# Patient Record
Sex: FEMALE | Race: WHITE | NOT HISPANIC OR LATINO | URBAN - METROPOLITAN AREA
[De-identification: names, ages, dates, MRNs, and addresses within clinical notes are randomized per-mention and may not be internally consistent; named-entity substitution may affect disease eponyms.]

---

## 2017-08-17 ENCOUNTER — OUTPATIENT (OUTPATIENT)
Dept: OUTPATIENT SERVICES | Facility: HOSPITAL | Age: 72
LOS: 1 days | End: 2017-08-17
Payer: MEDICARE

## 2017-08-17 ENCOUNTER — APPOINTMENT (OUTPATIENT)
Dept: ORTHOPEDIC SURGERY | Facility: CLINIC | Age: 72
End: 2017-08-17
Payer: MEDICARE

## 2017-08-17 ENCOUNTER — APPOINTMENT (OUTPATIENT)
Dept: CT IMAGING | Facility: CLINIC | Age: 72
End: 2017-08-17

## 2017-08-17 VITALS — HEIGHT: 63.5 IN | WEIGHT: 83 LBS | BODY MASS INDEX: 14.52 KG/M2

## 2017-08-17 DIAGNOSIS — S72.002A FRACTURE OF UNSPECIFIED PART OF NECK OF LEFT FEMUR, INITIAL ENCOUNTER FOR CLOSED FRACTURE: ICD-10-CM
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
## 2017-08-17 DIAGNOSIS — S72.012D UNSPECIFIED INTRACAPSULAR FRACTURE OF LEFT FEMUR, SUBSEQUENT ENCOUNTER FOR CLOSED FRACTURE WITH ROUTINE HEALING: ICD-10-CM

## 2017-08-17 DIAGNOSIS — Z80.9 FAMILY HISTORY OF MALIGNANT NEOPLASM, UNSPECIFIED: ICD-10-CM

## 2017-08-17 DIAGNOSIS — Z87.891 PERSONAL HISTORY OF NICOTINE DEPENDENCE: ICD-10-CM

## 2017-08-17 DIAGNOSIS — Z78.9 OTHER SPECIFIED HEALTH STATUS: ICD-10-CM

## 2017-08-17 DIAGNOSIS — Z00.8 ENCOUNTER FOR OTHER GENERAL EXAMINATION: ICD-10-CM

## 2017-08-17 PROCEDURE — 99205 OFFICE O/P NEW HI 60 MIN: CPT

## 2017-08-17 PROCEDURE — 73700 CT LOWER EXTREMITY W/O DYE: CPT | Mod: 26,LT

## 2017-08-17 PROCEDURE — 73552 X-RAY EXAM OF FEMUR 2/>: CPT | Mod: LT

## 2017-08-17 PROCEDURE — 76376 3D RENDER W/INTRP POSTPROCES: CPT

## 2017-08-17 PROCEDURE — 73700 CT LOWER EXTREMITY W/O DYE: CPT

## 2017-08-17 PROCEDURE — 73502 X-RAY EXAM HIP UNI 2-3 VIEWS: CPT | Mod: LT

## 2017-08-20 PROBLEM — Z87.891 FORMER SMOKER: Status: ACTIVE | Noted: 2017-08-17

## 2017-08-20 PROBLEM — Z80.9 FAMILY HISTORY OF MALIGNANT NEOPLASM: Status: ACTIVE | Noted: 2017-08-17

## 2017-08-20 PROBLEM — Z78.9 EXERCISES RARELY: Status: ACTIVE | Noted: 2017-08-17

## 2017-08-20 PROBLEM — S72.002A: Status: ACTIVE | Noted: 2017-08-17

## 2018-07-17 ENCOUNTER — OTHER (OUTPATIENT)
Age: 73
End: 2018-07-17

## 2018-07-24 PROBLEM — Z78.9 ALCOHOL USE: Status: INACTIVE | Noted: 2017-08-17

## 2023-01-18 ENCOUNTER — APPOINTMENT (OUTPATIENT)
Dept: RADIOLOGY | Facility: HOSPITAL | Age: 78
End: 2023-01-18
Payer: MEDICARE

## 2023-01-18 ENCOUNTER — OUTPATIENT (OUTPATIENT)
Dept: OUTPATIENT SERVICES | Facility: HOSPITAL | Age: 78
LOS: 1 days | End: 2023-01-18

## 2023-01-18 ENCOUNTER — OUTPATIENT (OUTPATIENT)
Dept: OUTPATIENT SERVICES | Facility: HOSPITAL | Age: 78
LOS: 1 days | Discharge: HOME | End: 2023-01-18

## 2023-01-18 ENCOUNTER — APPOINTMENT (OUTPATIENT)
Dept: SPEECH THERAPY | Facility: HOSPITAL | Age: 78
End: 2023-01-18
Payer: MEDICARE

## 2023-01-18 DIAGNOSIS — R13.12 DYSPHAGIA, OROPHARYNGEAL PHASE: ICD-10-CM

## 2023-01-18 DIAGNOSIS — D89.9 DISORDER INVOLVING THE IMMUNE MECHANISM, UNSPECIFIED: ICD-10-CM

## 2023-01-18 DIAGNOSIS — K21.00 GASTRO-ESOPHAGEAL REFLUX DISEASE WITH ESOPHAGITIS, WITHOUT BLEEDING: ICD-10-CM

## 2023-01-18 PROCEDURE — 74230 X-RAY XM SWLNG FUNCJ C+: CPT | Mod: 26

## 2023-01-18 NOTE — ASSESSMENT
[FreeTextEntry1] : MODIFIED BARIUM SWALLOW STUDY \par \par Date of Report: 1/18/23 \par Date of Evaluation: 1/18/23 \par Patient Name: Yris Herzog \par YOB: 1945 \par Primary Diagnosis: OroPharyngeal Dysphagia \par Treatment Diagnosis: OroPharyngeal Dysphagia \par Referring Physician: Dr. Yoan Nj \par \par Impressions/Results: No Aspiration viewed before, during or after the swallow for puree, minced and moist solids and thin liquids. \par \par Reason For Referral: This 77 year old female was seen for a Modified Barium Swallow to: rule out aspiration and assess for safest diet consistency, to determine patient's ability to safely tolerate an oral diet. The physician ordered this procedure because they want the patient to meet their nutrition/hydration needs by mouth without compromising respiratory status.  \par \par Patient arrived with her spouse to today’s session. Patient reporting difficulty swallowing for the last 3 years. Patient states she has dysphagia with both solids and liquids, however solids are more difficult to swallow. Patient states she “Can’t push it down” in regards to solids (i.e. toast, special K cereal). Patient states she also cuts her pills in half. Patient denies pneumonia, however, states she received the Heimlich 3 years ago due to a choking episode when taking medications.  \par  \par Medical History: Patient presents with medical history of the following per patient report: \par Myotonia \par  \par ASSESSMENT \par The patient was assessed seated in the lateral plane in the [facility], with Radiologist present. The patient was alert, cooperative. Secretion management was adequate. There was no coughing, throat clearing or wet/gurgly vocal quality prior to test administration. \par \par Consistencies Administered: \par Solids: Puree and minced and moist solids \par Liquids: Thin liquids \par \par SUMMARY & IMPRESSION \par \par The patient demonstrated the following: \par 1. Functional oral stage for puree, minced, and thin liquids marked by adequate oral acceptance, chewing/collection and adequate tongue motion for anterior to posterior transport. Adequate oral clearance post primary swallow.  \par 2. Mild-moderate pharyngeal dysphagia for puree, minced and moist solids, and thin liquids marked by an adequate pharyngeal swallow trigger, reduced hyolaryngeal elevation, reduced base of tongue retraction with reduced epiglottic deflection which may be exacerbated by incidental finding of a potential point of narrowing at C4-C5 resulting in mild-moderate vallecular residue post primary swallow, adequate pharyngeal constriction. There is mild to moderate pharyngeal clearance deficits located primarily in the valleculae (puree/minced and moist solids). Spontaneous multiple re-swallows (2-3 swallows) and a thin liquid wash partially assists with pharyngeal clearance. No Aspiration viewed before, during or after the swallow for puree, minced and moist,  solids, and thin liquids. \par \par Of Note 1: Patient declined soft and bite sized solid trials.\par Of Note 2: There is an incidental finding of potential point of narrowing of the esophagus likely secondary to an anterior Osteophyte at the level of C4-C5, per Radiologist (please see Radiologist report for details)  \par \par Aspiration - Penetration Scale: \par PUREE: 1 \par MINCED AND MOIST SOLIDS: 1 \par THIN LIQUIDS: 1 \par \par Aspiration - Penetration Scale ([place] et al Dysphagia 11:93-98 (April 1996), Aspiration-Penetration Scale) \par 1. Material does not enter the airway \par 2. Material enters the airway, remains above the vocal folds, and is ejected from the airway \par 3. Material enters the airway, remains above the vocal folds, and is not ejected \par 4. Material enters the airway, contacts the vocal folds, and is ejected from the airway \par 5. Material enters the airway, contacts the vocal folds, and is not ejected from the airway \par 6. Material enters the airway, passes below the vocal folds and is ejected into the larynx or out of the airway \par 7. Material enters the airway, passes below the vocal folds, and is not ejected from the trachea despite effort \par 8. Material enters the airway, passes below the vocal folds, and no effort is made to eject \par \par Recommendations: \par 1) Puree with thin liquids \par 2) Feeding/swallowing guidelines: Upright positioning, alternate puree and liquids, Swallow x2 per puree and sips of liquids \par 3) Swallow therapy at MD’s discretion to maximize swallow function \par 4) Follow up with referring ENT physician \par 5) Consider further/additional work up given incidental finding (i.e. CT of Neck) at MD's discretion\par \par The above results and recommendations have been discussed with the patient/spouse. All questions were answered with patient demonstrating good understanding. \par \par Should you have any additional concerns, please contact the Center at [phone]. \par \par Bibi Wahl MS, CCC-SLP \par Speech-Language Pathologist \par [facility]

## 2023-01-19 ENCOUNTER — NON-APPOINTMENT (OUTPATIENT)
Age: 78
End: 2023-01-19

## 2023-01-20 DIAGNOSIS — R13.13 DYSPHAGIA, PHARYNGEAL PHASE: ICD-10-CM
